# Patient Record
Sex: MALE | Race: WHITE | URBAN - METROPOLITAN AREA
[De-identification: names, ages, dates, MRNs, and addresses within clinical notes are randomized per-mention and may not be internally consistent; named-entity substitution may affect disease eponyms.]

---

## 2020-01-06 ENCOUNTER — OFFICE VISIT (OUTPATIENT)
Dept: ORTHOPEDIC SURGERY | Age: 60
End: 2020-01-06
Payer: COMMERCIAL

## 2020-01-06 VITALS
HEIGHT: 73 IN | SYSTOLIC BLOOD PRESSURE: 106 MMHG | WEIGHT: 180 LBS | BODY MASS INDEX: 23.86 KG/M2 | HEART RATE: 75 BPM | DIASTOLIC BLOOD PRESSURE: 71 MMHG

## 2020-01-06 PROCEDURE — 99204 OFFICE O/P NEW MOD 45 MIN: CPT | Performed by: ORTHOPAEDIC SURGERY

## 2020-01-06 RX ORDER — LISINOPRIL AND HYDROCHLOROTHIAZIDE 12.5; 1 MG/1; MG/1
1 TABLET ORAL
COMMUNITY
Start: 2019-07-11

## 2020-01-06 RX ORDER — METHYLPREDNISOLONE 4 MG/1
TABLET ORAL
COMMUNITY
Start: 2019-10-01

## 2020-01-06 RX ORDER — SILDENAFIL 100 MG/1
100 TABLET, FILM COATED ORAL
COMMUNITY
Start: 2019-07-11

## 2020-01-06 RX ORDER — MAGNESIUM GLUCONATE 30 MG(550)
30 TABLET ORAL
COMMUNITY

## 2020-01-06 NOTE — PROGRESS NOTES
tobacco: Never Used   Substance Use Topics    Alcohol use: Yes    Drug use: Never       Current Outpatient Medications   Medication Sig Dispense Refill    sildenafil (VIAGRA) 100 MG tablet Take 100 mg by mouth      methylPREDNISolone (MEDROL DOSEPACK) 4 MG tablet follow package directions      lisinopril-hydrochlorothiazide (PRINZIDE;ZESTORETIC) 10-12.5 MG per tablet Take 1 tablet by mouth      Magnesium Gluconate 550 MG TABS Take 30 mg by mouth       No current facility-administered medications for this visit. No Known Allergies        Physical Exam:  /71   Pulse 75   Ht 6' 1\" (1.854 m)   Wt 180 lb (81.6 kg)   BMI 23.75 kg/m²     General: No acute distress, well nourished  CV: No obvious peripheral edema. Normal peripheral pulses  Neuro: Alert & oriented x 3  Psych: Normal mood and affect       Left Knee Exam:   Overall alignment is appreciated. varus     Gait/Alignment: No use of assistive devices. Patella tracking: No J sign. Inspection/Skin: Skin is intact without erythema or ecchymosis. No significant swelling. No deformity. Effusion; none. Palpation: Non-tender to the medial or lateral joint line. No fullness in the popliteal fossa. No pain with patellar grind testing. Non-tender to the medial or lateral patellar facets. Non-tender to medial and lateral collateral ligaments. No significant Patellofemoral crepitus. Calf compartments are soft and non-tender. No signs of DVT. Range of Motion: From 0 to 130 degrees of flexion without pain. Internal and external rotation of the hip are maintained without pain or deficit. Strength: 5/5 strength of the quadriceps and hamstrings. Ligamentous Stability: Stable to valgus and varus stress testing at 0° and 30°. Slight increased translation with Lachman. Flexion rotation test reproduces a sharp posteromedial pain     Neurologic and vascular: Intact sensation to light touch in all 5 digits.  2+ palpable pedal pulses. Comparison Right Knee Exam:   Overall alignment is appreciated. Within normal limits. Gait/Alignment: No use of assistive devices. Patella tracking: No J sign. Inspection/Skin: Skin is intact without erythema or ecchymosis. No significant swelling. No deformity. Effusion; none. Palpation: Non-tender to the medial or lateral joint line. No fullness in the popliteal fossa. No pain with patellar grind testing. Non-tender to the medial or lateral patellar facets. Non-tender to medial and lateral collateral ligaments. No significant Patellofemoral crepitus. Calf compartments are soft and non-tender. No signs of DVT. Range of Motion: From 0 to 130 degrees of flexion without pain. Internal and external rotation of the hip are maintained without pain or deficit. Strength: 5/5 strength of the quadriceps and hamstrings. Ligamentous Stability: Stable to valgus and varus stress testing at 0° and 30°. Elizabeth's does not reproduce pain. Lachman's has a solid endpoint. Neurologic and vascular: Intact sensation to light touch in all 5 digits. 2+ palpable pedal pulses. Diagnostics:  Radiology:     X-rays of the left knee including bilateral AP, bilateral Tunnel, Bilateral Merchant and a lateral were obtained and reviewed in office:    Impression: Mild osteoarthritis with about 50% narrowing in the lateral compartment, there is some posterior osteophytes noted mild narrowing in the medial compartment. Assessment:   Left knee medial meniscus tear  mild osteoarthritis worst in the lateral compartment    Plan:   Pertinent imaging was reviewed. The etiology, natural history, and treatment options for the disorder were discussed. The roles of activity modifications, medications, injections, physical therapy, and surgical interventions were all described to the patient and questions were answered.     At this time his exam is concerning for possible posterior

## 2020-01-13 ENCOUNTER — OFFICE VISIT (OUTPATIENT)
Dept: ORTHOPEDIC SURGERY | Age: 60
End: 2020-01-13
Payer: COMMERCIAL

## 2020-01-13 VITALS
HEIGHT: 73 IN | DIASTOLIC BLOOD PRESSURE: 72 MMHG | WEIGHT: 180 LBS | BODY MASS INDEX: 23.86 KG/M2 | HEART RATE: 63 BPM | SYSTOLIC BLOOD PRESSURE: 114 MMHG

## 2020-01-13 PROCEDURE — 99213 OFFICE O/P EST LOW 20 MIN: CPT | Performed by: PHYSICIAN ASSISTANT

## 2020-01-13 RX ORDER — DICLOFENAC SODIUM 75 MG/1
75 TABLET, DELAYED RELEASE ORAL 2 TIMES DAILY
Qty: 60 TABLET | Refills: 0 | Status: SHIPPED | OUTPATIENT
Start: 2020-01-13

## 2020-01-13 NOTE — LETTER
PRESCRIPTION FOR PHYSICAL THERAPY      Patient Name: Remi Lefort MRN: <[id number]>  DOS: 1/13/2020     Diagnosis:   1. Tear of medial meniscus of left knee, unspecified tear type, unspecified whether old or current tear, initial encounter    2. Degenerative tear of left medial meniscus                                                      Onset Date of Medial sided pain: 12/27/2019      Goal:  [x]Decrease Pain and/or Swelling [x]Increase ROM and/or Flexibility     [x]Increase Function   [x]Increase Strength and/or Endurance   []Other     Evaluation:  [x]Evaluation and Treatment []KT-1000 []Isokinetic Exam []Preoperative Eval    Recommended Modalities:  [x]Modalities of Choice      []HCVS            []Electrical Stimulation     [] Remove Dressing  []Ultrasound        []TENS/TNS    [] Lumbar Traction           [] Cervical Traction  []Phonophoresis         []Hot Pack/Cold Pack   []PT Treatment, Unlisted []Other:    Therapeutic Exercises:    []Isometrics    []Range of Motion []Progressive Exer. []Balance Coordination   []Flexibility  []ROM Limited  []Total Hip Replacement   []Passive  []ROM Full   []Total Knee Replacement   []Active Assisted    []Shoulder Impingement Prog  []Active   []Tennis Elbow Program   []Capsular Shift Regular        []Isokinetics                     []Spine Program   [x]Straight Leg Raises  [] Gait  []Fixation    [] Supine    [] Running    [] Extension    [] Prone   [] Throwing   [] Stabilization   [] AB    [] [place] Kriste Client    [] AD      [] Spine Eval   [] Cervical Eval  [] Conditioning   [] Lumbar    [] Stationary Bike   [] Lumbar Exer.   [] Stairmaster   [] Functional Cap   [] Alvan Track   [] Return to work   [] Treadmill  []Other :     [] Aquatic Prog.      Treatment Program:  Frequency: [] 1x  [x] 2x  [] 3x  [] 4x  [] 5x week  Duration: [x] 1  [] 2  [] 3  [] 4  [] 5 month   Weight Bearing: [] Non  [] 1/4  [] 1/2  [] 3/4  [] Full  ROM: [] Restricted  [] Full
[x] Follow established: medial mensicus tear       [] Other:

## 2020-01-13 NOTE — PROGRESS NOTES
well-developed and well-nourished. The patient is alert and oriented x3 and was cooperative throughout the visit. LEFT knee exam    Gait: No use of assistive devices. No antalgic gait. Alignment: normal alignment. Inspection/skin: Skin is intact without erythema or ecchymosis. No gross deformity. Palpation: mild crepitus. medial joint line tenderness present. Range of Motion: Limited flexion secondary to pain. Full extension. Strength: Normal quadriceps development. Effusion: No effusion or swelling present. Ligamentous stability:  deferred    Neurologic and vascular: Skin is warm and well-perfused. Sensation is intact to light-touch. Special tests: Positive Elizabeth sign. RIGHT knee exam    Gait: No use of assistive devices. No antalgic gait. Alignment: normal alignment. Inspection/skin: Skin is intact without erythema or ecchymosis. No gross deformity. Palpation: mild crepitus. no joint line tenderness present. Range of Motion: There is full range of motion. Strength: Normal quadriceps development. Effusion: No effusion or swelling present. Ligamentous stability: No cruciate or collateral ligament instability. Neurologic and vascular: Skin is warm and well-perfused. Sensation is intact to light-touch. Special tests: Negative Elizabeth sign. Radiology:       Pertinent imaging reviewed, both images and report. MRI of LEFT knee dated 1/10/2020:  CONCLUSION:   1. The ACL is chronically torn. 2. Radial tear of the lateral aspect of the posterior horn of the medial meniscus adjacent to    the meniscal root. 3. Tear of the medial-most aspect of the posterior horn of the lateral meniscus near the    meniscal root.    4. Lateral compartmental osteoarthritis with focal high grade chondromalacia within the    posterior aspect of the weightbearing lateral femoral condyle and a subchondral cyst within the    posterior aspect of the lateral

## 2020-01-20 ENCOUNTER — HOSPITAL ENCOUNTER (OUTPATIENT)
Dept: PHYSICAL THERAPY | Age: 60
Discharge: HOME | End: 2020-01-20
Payer: COMMERCIAL

## 2020-02-03 ENCOUNTER — OFFICE VISIT (OUTPATIENT)
Dept: ORTHOPEDIC SURGERY | Age: 60
End: 2020-02-03
Payer: COMMERCIAL

## 2020-02-03 PROCEDURE — 99213 OFFICE O/P EST LOW 20 MIN: CPT | Performed by: ORTHOPAEDIC SURGERY

## 2020-02-03 NOTE — PROGRESS NOTES
Chief Complaint  Follow-up (left knee. pt states that he is a little better than he was inititally )      History of Present Illness:  Dwain Dela Cruz is a pleasant 61 y.o. male who presents today for follow up evaluation of left knee pain. We have been treating him conservatively for medial and lateral meniscus tears consistent of physical therapy and Diclofenac. He also has a history of prior ACL injury that has been followed by Dr. Modesta Braswell long term. He is not having pain today but states he has become more cautious with his activity including stairs and uneven ground due to instability. He has avoided running, jumping and twisting activity. He is able to walk and cycle with no issues. Denies any new injuries. Medical History:  Patient's medications, allergies, past medical, surgical, social and family histories were reviewed and updated as appropriate. Pertinent items are noted in HPI  Review of systems reviewed from Patient History Form completed today and available in the patient's chart under the Media tab. No past medical history on file. No past surgical history on file. No family history on file. Social History     Socioeconomic History    Marital status:      Spouse name: Not on file    Number of children: Not on file    Years of education: Not on file    Highest education level: Not on file   Occupational History    Not on file   Social Needs    Financial resource strain: Not on file    Food insecurity:     Worry: Not on file     Inability: Not on file    Transportation needs:     Medical: Not on file     Non-medical: Not on file   Tobacco Use    Smoking status: Never Smoker    Smokeless tobacco: Never Used   Substance and Sexual Activity    Alcohol use:  Yes    Drug use: Never    Sexual activity: Not on file   Lifestyle    Physical activity:     Days per week: Not on file     Minutes per session: Not on file    Stress: Not on file   Relationships    Social left knee, initial encounter        Office Procedures:  No orders of the defined types were placed in this encounter. Plan: Pertinent imaging was reviewed. The etiology, natural history, and treatment options for the disorder were discussed. The roles of activity medication, antiinflammatories, injections, bracing, physical therapy, and surgical interventions were all described to the patient and questions were answered. He has a very unstable knee. We discussed activity modification, continued strengthening and bracing versus surgical intervention. If he continues to feel apprehensive with stairs and uneven ground I would recommend he proceed with surgical intervention. At this time he wishes to continue strengthening exercises and activity modification, avoiding running, jumping, and twisting. I will see him back if symptoms persist or worsen. Makenna Burnham is in agreement with this plan. All questions were answered to patient's satisfaction and was encouraged to call with any further questions. I, Rene Jaimes ATC, am scribing for and in the presence of Dr. Blas Cardoso. 02/03/20 3:12 PM Rene Jaimes ATC        I personally reviewed the patient's pain scale, review of systems, family history, social history, past medical history, allergies and medications. Review of systems was collected today, reviewed and is included in the medical record. It is available under the media tab. I personally performed the services described in this documentation and scribed by Rene Jaimes ATC. James Brandon MD  Sports Medicine, Arthroscopic Knee and Shoulder Surgery    This dictation was performed with a verbal recognition program [facility]) and it was checked for errors. It is possible that there are still dictated errors within this office note. If so, please bring any errors to my attention for an addendum. All efforts were made to ensure that this office note is accurate.